# Patient Record
Sex: MALE | Race: WHITE | Employment: OTHER | URBAN - METROPOLITAN AREA
[De-identification: names, ages, dates, MRNs, and addresses within clinical notes are randomized per-mention and may not be internally consistent; named-entity substitution may affect disease eponyms.]

---

## 2021-06-30 ENCOUNTER — TELEPHONE (OUTPATIENT)
Dept: CARDIAC REHAB | Age: 86
End: 2021-06-30

## 2021-06-30 NOTE — TELEPHONE ENCOUNTER
Call received from pt that he attempted to come for scheduled orientation but the Suite he presented to was closed. Returned pt call and left message that he had gone to the wrong Suite in [facility] [address] which is [facility] which closes  From 12pm to 1pm. Needed to come to [address]. Pt encouraged to return call to reschedule orientation.